# Patient Record
Sex: MALE | Race: WHITE | NOT HISPANIC OR LATINO | URBAN - METROPOLITAN AREA
[De-identification: names, ages, dates, MRNs, and addresses within clinical notes are randomized per-mention and may not be internally consistent; named-entity substitution may affect disease eponyms.]

---

## 2018-09-12 ENCOUNTER — INPATIENT (INPATIENT)
Facility: HOSPITAL | Age: 23
LOS: 8 days | Discharge: HOME | End: 2018-09-21
Attending: ORTHOPAEDIC SURGERY | Admitting: ORTHOPAEDIC SURGERY
Payer: COMMERCIAL

## 2018-09-12 VITALS
TEMPERATURE: 98 F | SYSTOLIC BLOOD PRESSURE: 120 MMHG | OXYGEN SATURATION: 99 % | HEART RATE: 79 BPM | DIASTOLIC BLOOD PRESSURE: 74 MMHG | RESPIRATION RATE: 17 BRPM

## 2018-09-12 LAB
ALBUMIN SERPL ELPH-MCNC: 4.9 G/DL — SIGNIFICANT CHANGE UP (ref 3.5–5.2)
ALP SERPL-CCNC: 98 U/L — SIGNIFICANT CHANGE UP (ref 30–115)
ALT FLD-CCNC: 18 U/L — SIGNIFICANT CHANGE UP (ref 0–41)
ANION GAP SERPL CALC-SCNC: 14 MMOL/L — SIGNIFICANT CHANGE UP (ref 7–14)
APPEARANCE UR: CLEAR — SIGNIFICANT CHANGE UP
APTT BLD: 29.1 SEC — SIGNIFICANT CHANGE UP (ref 27–39.2)
AST SERPL-CCNC: 24 U/L — SIGNIFICANT CHANGE UP (ref 0–41)
BASOPHILS # BLD AUTO: 0.05 K/UL — SIGNIFICANT CHANGE UP (ref 0–0.2)
BASOPHILS NFR BLD AUTO: 0.5 % — SIGNIFICANT CHANGE UP (ref 0–1)
BILIRUB DIRECT SERPL-MCNC: <0.2 MG/DL — SIGNIFICANT CHANGE UP (ref 0–0.2)
BILIRUB INDIRECT FLD-MCNC: >0.2 MG/DL — SIGNIFICANT CHANGE UP (ref 0.2–1.2)
BILIRUB SERPL-MCNC: 0.4 MG/DL — SIGNIFICANT CHANGE UP (ref 0.2–1.2)
BILIRUB UR-MCNC: NEGATIVE — SIGNIFICANT CHANGE UP
BLD GP AB SCN SERPL QL: SIGNIFICANT CHANGE UP
BUN SERPL-MCNC: 12 MG/DL — SIGNIFICANT CHANGE UP (ref 10–20)
CALCIUM SERPL-MCNC: 9.1 MG/DL — SIGNIFICANT CHANGE UP (ref 8.5–10.1)
CHLORIDE SERPL-SCNC: 98 MMOL/L — SIGNIFICANT CHANGE UP (ref 98–110)
CO2 SERPL-SCNC: 27 MMOL/L — SIGNIFICANT CHANGE UP (ref 17–32)
COLOR SPEC: YELLOW — SIGNIFICANT CHANGE UP
CREAT SERPL-MCNC: 1 MG/DL — SIGNIFICANT CHANGE UP (ref 0.7–1.5)
DIFF PNL FLD: NEGATIVE — SIGNIFICANT CHANGE UP
EOSINOPHIL # BLD AUTO: 0.16 K/UL — SIGNIFICANT CHANGE UP (ref 0–0.7)
EOSINOPHIL NFR BLD AUTO: 1.5 % — SIGNIFICANT CHANGE UP (ref 0–8)
GLUCOSE SERPL-MCNC: 110 MG/DL — HIGH (ref 70–99)
GLUCOSE UR QL: NEGATIVE MG/DL — SIGNIFICANT CHANGE UP
HCT VFR BLD CALC: 41.3 % — LOW (ref 42–52)
HGB BLD-MCNC: 14.1 G/DL — SIGNIFICANT CHANGE UP (ref 14–18)
IMM GRANULOCYTES NFR BLD AUTO: 0.6 % — HIGH (ref 0.1–0.3)
INR BLD: 1.18 RATIO — SIGNIFICANT CHANGE UP (ref 0.65–1.3)
KETONES UR-MCNC: NEGATIVE — SIGNIFICANT CHANGE UP
LACTATE SERPL-SCNC: 1.5 MMOL/L — SIGNIFICANT CHANGE UP (ref 0.5–2.2)
LEUKOCYTE ESTERASE UR-ACNC: NEGATIVE — SIGNIFICANT CHANGE UP
LIDOCAIN IGE QN: 32 U/L — SIGNIFICANT CHANGE UP (ref 7–60)
LYMPHOCYTES # BLD AUTO: 1.55 K/UL — SIGNIFICANT CHANGE UP (ref 1.2–3.4)
LYMPHOCYTES # BLD AUTO: 14.7 % — LOW (ref 20.5–51.1)
MCHC RBC-ENTMCNC: 29 PG — SIGNIFICANT CHANGE UP (ref 27–31)
MCHC RBC-ENTMCNC: 34.1 G/DL — SIGNIFICANT CHANGE UP (ref 32–37)
MCV RBC AUTO: 85 FL — SIGNIFICANT CHANGE UP (ref 80–94)
MONOCYTES # BLD AUTO: 0.8 K/UL — HIGH (ref 0.1–0.6)
MONOCYTES NFR BLD AUTO: 7.6 % — SIGNIFICANT CHANGE UP (ref 1.7–9.3)
NEUTROPHILS # BLD AUTO: 7.96 K/UL — HIGH (ref 1.4–6.5)
NEUTROPHILS NFR BLD AUTO: 75.1 % — SIGNIFICANT CHANGE UP (ref 42.2–75.2)
NITRITE UR-MCNC: NEGATIVE — SIGNIFICANT CHANGE UP
NRBC # BLD: 0 /100 WBCS — SIGNIFICANT CHANGE UP (ref 0–0)
PH UR: 7 — SIGNIFICANT CHANGE UP (ref 5–8)
PLATELET # BLD AUTO: 220 K/UL — SIGNIFICANT CHANGE UP (ref 130–400)
POTASSIUM SERPL-MCNC: 4.2 MMOL/L — SIGNIFICANT CHANGE UP (ref 3.5–5)
POTASSIUM SERPL-SCNC: 4.2 MMOL/L — SIGNIFICANT CHANGE UP (ref 3.5–5)
PROT SERPL-MCNC: 7.6 G/DL — SIGNIFICANT CHANGE UP (ref 6–8)
PROT UR-MCNC: NEGATIVE MG/DL — SIGNIFICANT CHANGE UP
PROTHROM AB SERPL-ACNC: 12.7 SEC — SIGNIFICANT CHANGE UP (ref 9.95–12.87)
RBC # BLD: 4.86 M/UL — SIGNIFICANT CHANGE UP (ref 4.7–6.1)
RBC # FLD: 11.9 % — SIGNIFICANT CHANGE UP (ref 11.5–14.5)
SODIUM SERPL-SCNC: 139 MMOL/L — SIGNIFICANT CHANGE UP (ref 135–146)
SP GR SPEC: 1.01 — SIGNIFICANT CHANGE UP (ref 1.01–1.03)
TYPE + AB SCN PNL BLD: SIGNIFICANT CHANGE UP
UROBILINOGEN FLD QL: 0.2 MG/DL — SIGNIFICANT CHANGE UP (ref 0.2–0.2)
WBC # BLD: 10.58 K/UL — SIGNIFICANT CHANGE UP (ref 4.8–10.8)
WBC # FLD AUTO: 10.58 K/UL — SIGNIFICANT CHANGE UP (ref 4.8–10.8)

## 2018-09-12 RX ORDER — MORPHINE SULFATE 50 MG/1
4 CAPSULE, EXTENDED RELEASE ORAL ONCE
Qty: 0 | Refills: 0 | Status: DISCONTINUED | OUTPATIENT
Start: 2018-09-12 | End: 2018-09-12

## 2018-09-12 RX ORDER — PANTOPRAZOLE SODIUM 20 MG/1
40 TABLET, DELAYED RELEASE ORAL
Qty: 0 | Refills: 0 | Status: DISCONTINUED | OUTPATIENT
Start: 2018-09-12 | End: 2018-09-15

## 2018-09-12 RX ORDER — SODIUM CHLORIDE 9 MG/ML
500 INJECTION INTRAMUSCULAR; INTRAVENOUS; SUBCUTANEOUS ONCE
Qty: 0 | Refills: 0 | Status: COMPLETED | OUTPATIENT
Start: 2018-09-12 | End: 2018-09-12

## 2018-09-12 RX ORDER — ONDANSETRON 8 MG/1
4 TABLET, FILM COATED ORAL ONCE
Qty: 0 | Refills: 0 | Status: COMPLETED | OUTPATIENT
Start: 2018-09-12 | End: 2018-09-12

## 2018-09-12 RX ORDER — TETANUS TOXOID, REDUCED DIPHTHERIA TOXOID AND ACELLULAR PERTUSSIS VACCINE, ADSORBED 5; 2.5; 8; 8; 2.5 [IU]/.5ML; [IU]/.5ML; UG/.5ML; UG/.5ML; UG/.5ML
0.5 SUSPENSION INTRAMUSCULAR ONCE
Qty: 0 | Refills: 0 | Status: COMPLETED | OUTPATIENT
Start: 2018-09-12 | End: 2018-09-12

## 2018-09-12 RX ORDER — ACETAMINOPHEN 500 MG
650 TABLET ORAL EVERY 6 HOURS
Qty: 0 | Refills: 0 | Status: DISCONTINUED | OUTPATIENT
Start: 2018-09-12 | End: 2018-09-13

## 2018-09-12 RX ORDER — IBUPROFEN 200 MG
400 TABLET ORAL EVERY 6 HOURS
Qty: 0 | Refills: 0 | Status: DISCONTINUED | OUTPATIENT
Start: 2018-09-12 | End: 2018-09-13

## 2018-09-12 RX ORDER — DEXTROSE MONOHYDRATE, SODIUM CHLORIDE, AND POTASSIUM CHLORIDE 50; .745; 4.5 G/1000ML; G/1000ML; G/1000ML
1000 INJECTION, SOLUTION INTRAVENOUS
Qty: 0 | Refills: 0 | Status: DISCONTINUED | OUTPATIENT
Start: 2018-09-12 | End: 2018-09-12

## 2018-09-12 RX ORDER — OXYCODONE AND ACETAMINOPHEN 5; 325 MG/1; MG/1
1 TABLET ORAL EVERY 6 HOURS
Qty: 0 | Refills: 0 | Status: DISCONTINUED | OUTPATIENT
Start: 2018-09-12 | End: 2018-09-13

## 2018-09-12 RX ADMIN — SODIUM CHLORIDE 1000 MILLILITER(S): 9 INJECTION INTRAMUSCULAR; INTRAVENOUS; SUBCUTANEOUS at 14:30

## 2018-09-12 RX ADMIN — TETANUS TOXOID, REDUCED DIPHTHERIA TOXOID AND ACELLULAR PERTUSSIS VACCINE, ADSORBED 0.5 MILLILITER(S): 5; 2.5; 8; 8; 2.5 SUSPENSION INTRAMUSCULAR at 16:41

## 2018-09-12 RX ADMIN — ONDANSETRON 4 MILLIGRAM(S): 8 TABLET, FILM COATED ORAL at 14:30

## 2018-09-12 RX ADMIN — MORPHINE SULFATE 4 MILLIGRAM(S): 50 CAPSULE, EXTENDED RELEASE ORAL at 14:30

## 2018-09-12 RX ADMIN — MORPHINE SULFATE 4 MILLIGRAM(S): 50 CAPSULE, EXTENDED RELEASE ORAL at 18:03

## 2018-09-12 NOTE — ED PROVIDER NOTE - NS ED ROS FT
Review of Systems  Constitutional: (-) fever  Eyes/ENT: (-) blurry vision, (-) epistaxis  Cardiovascular: (-) chest pain, (-) syncope  Respiratory: (-) cough, (-) shortness of breath  Gastrointestinal: (-) vomiting, (-) diarrhea  Musculoskeletal: (-) neck pain, (-) back pain, (-) joint pain  Integumentary: (-) rash  Neurological: (-) headache, (-) altered mental status

## 2018-09-12 NOTE — H&P ADULT - RS GEN PE MLT RESP DETAILS PC
clear to auscultation bilaterally/airway patent/no rhonchi/no rales/no chest wall tenderness/respirations non-labored
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
## 2018-09-12 NOTE — ED PROVIDER NOTE - PHYSICAL EXAMINATION
Gen: Alert, in visible distress secondary to injuries/pain   Head: Posterior scalp hematoma, PERRL, EOMI, normal lids/conjunctiva, no cuevas sign  ENT: normal hearing, patent oropharynx without erythema/exudate, uvula midline  Neck: +supple, no midline tenderness  Pulm: Bilateral BS, normal resp effort, no wheeze/stridor/retractions  CV: RRR, no M/R/G  Abd: soft, NT/ND  Mskel: Left Wrist- posterior deformity with tenderness to palpation laterally. Decreased ROM in flexion, extension, pronation, supination.  strength 3/5.            Right ankle- medial deformity. Unable to ambulate or bear weight            No shoulder, elbow, vertebral, paraspinal, pelvic, or hip tenderness present on palpation.   Skin: Abrasions present on the Left ribs, anterior chest, right anterior forearm, right palmar surface of hand, left anterior forearm, right proximal medial thigh, and proximal right knee. Well healed scar present on the left anterior chest from prior trauma.   Neuro: AAOx3, no sensory deficits  Vascular: Radial and pedal pulses present bilaterally 2+

## 2018-09-12 NOTE — H&P ADULT - NSHPLABSRESULTS_GEN_ALL_CORE
< from: CT Angio Pelvis w/ IV Cont (09.12.18 @ 16:56) >    EXAM:  CT ANGIO CHEST (W)AW IC        EXAM:  CT ANGIO ABD PELVIS (W)AW ICIMPRESSION:    No CT evidence of acute traumatic intrathoracic or intra-abdominal   pathology.    < end of copied text >        < from: Xray Foot AP + Lateral, Right (09.12.18 @ 16:06) >      EXAM:  XR FOOT 2 VIEWS RT      Partially visualized distal tibial fracture. The rest of the osseous   structures are intact.    < end of copied text >        < from: Xray Tibia + Fibula 2 Views, Right (09.12.18 @ 16:05) >    EXAM:  XR TIB FIB AP LAT 2 VIEWS RT    There is slightly displaced spiral fracture involvingthe distal tibia.   Fibula appears to be intact. There is soft tissue swelling of the distal   lower extremity.    < end of copied text >          < from: Xray Ankle Complete 3 Views, Right (09.12.18 @ 16:05) >      EXAM:  XR ANKLE COMP MIN 3 VIEWS RT      There is a slightly displaced spiral fracture involving the distal tibia.   Associated soft tissue swelling in the left lower extremity. Ankle joint   is intact without any dislocation.    < end of copied text >        < from: Xray Wrist 3 Views, Left (09.12.18 @ 16:04) >    EXAM:  XR WRIST COMP MIN 3 VIEWS LT   There is displaced and comminuted intra-articular fracture involving the   distal radius. The distal fracture fragments are displaced ventrally. The   carpal bones and abdominal are intact. There is soft tissue swelling   around the wrist.    < end of copied text >        < from: Xray Forearm, Left (09.12.18 @ 16:03) >    EXAM:  XR FOREARM 2 VIEWS LT    There is a comminuted and displaced cortical fracture of the distal   radius. The distal fracture fragments are ventrally displaced. The   proximal and mid radius and ulna are within normal limits.    < end of copied text >          < from: Xray Hand 3 Views, Left (09.12.18 @ 16:03) >    EXAM:  XR HAND MIN 3 VIEWS LT    Displaced and comminuted intra-articular fracture of distal radius.   Reminder of the osseous structures are intact. There is soft tissue   swelling around the wrist.    < end of copied text >
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
## 2018-09-12 NOTE — ED ADULT NURSE NOTE - NSIMPLEMENTINTERV_GEN_ALL_ED
Implemented All Fall Risk Interventions:  [place] to call system. Call bell, personal items and telephone within reach. Instruct patient to call for assistance. Room bathroom lighting operational. Non-slip footwear when patient is off stretcher. Physically safe environment: no spills, clutter or unnecessary equipment. Stretcher in lowest position, wheels locked, appropriate side rails in place. Provide visual cue, wrist band, yellow gown, etc. Monitor gait and stability. Monitor for mental status changes and reorient to person, place, and time. Review medications for side effects contributing to fall risk. Reinforce activity limits and safety measures with patient and family.
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
## 2018-09-13 LAB
ANION GAP SERPL CALC-SCNC: 16 MMOL/L — HIGH (ref 7–14)
BASOPHILS # BLD AUTO: 0.05 K/UL — SIGNIFICANT CHANGE UP (ref 0–0.2)
BASOPHILS NFR BLD AUTO: 0.4 % — SIGNIFICANT CHANGE UP (ref 0–1)
BUN SERPL-MCNC: 9 MG/DL — LOW (ref 10–20)
CALCIUM SERPL-MCNC: 9 MG/DL — SIGNIFICANT CHANGE UP (ref 8.5–10.1)
CHLORIDE SERPL-SCNC: 101 MMOL/L — SIGNIFICANT CHANGE UP (ref 98–110)
CO2 SERPL-SCNC: 23 MMOL/L — SIGNIFICANT CHANGE UP (ref 17–32)
CREAT SERPL-MCNC: 0.8 MG/DL — SIGNIFICANT CHANGE UP (ref 0.7–1.5)
EOSINOPHIL # BLD AUTO: 0.04 K/UL — SIGNIFICANT CHANGE UP (ref 0–0.7)
EOSINOPHIL NFR BLD AUTO: 0.3 % — SIGNIFICANT CHANGE UP (ref 0–8)
GLUCOSE SERPL-MCNC: 104 MG/DL — HIGH (ref 70–99)
HCT VFR BLD CALC: 40.2 % — LOW (ref 42–52)
HGB BLD-MCNC: 13.8 G/DL — LOW (ref 14–18)
IMM GRANULOCYTES NFR BLD AUTO: 0.6 % — HIGH (ref 0.1–0.3)
LYMPHOCYTES # BLD AUTO: 1.67 K/UL — SIGNIFICANT CHANGE UP (ref 1.2–3.4)
LYMPHOCYTES # BLD AUTO: 13.8 % — LOW (ref 20.5–51.1)
MAGNESIUM SERPL-MCNC: 1.8 MG/DL — SIGNIFICANT CHANGE UP (ref 1.8–2.4)
MCHC RBC-ENTMCNC: 28.7 PG — SIGNIFICANT CHANGE UP (ref 27–31)
MCHC RBC-ENTMCNC: 34.3 G/DL — SIGNIFICANT CHANGE UP (ref 32–37)
MCV RBC AUTO: 83.6 FL — SIGNIFICANT CHANGE UP (ref 80–94)
MONOCYTES # BLD AUTO: 1.43 K/UL — HIGH (ref 0.1–0.6)
MONOCYTES NFR BLD AUTO: 11.8 % — HIGH (ref 1.7–9.3)
NEUTROPHILS # BLD AUTO: 8.83 K/UL — HIGH (ref 1.4–6.5)
NEUTROPHILS NFR BLD AUTO: 73.1 % — SIGNIFICANT CHANGE UP (ref 42.2–75.2)
NRBC # BLD: 0 /100 WBCS — SIGNIFICANT CHANGE UP (ref 0–0)
PHOSPHATE SERPL-MCNC: 3.5 MG/DL — SIGNIFICANT CHANGE UP (ref 2.1–4.9)
PLATELET # BLD AUTO: 202 K/UL — SIGNIFICANT CHANGE UP (ref 130–400)
POTASSIUM SERPL-MCNC: 3.9 MMOL/L — SIGNIFICANT CHANGE UP (ref 3.5–5)
POTASSIUM SERPL-SCNC: 3.9 MMOL/L — SIGNIFICANT CHANGE UP (ref 3.5–5)
RBC # BLD: 4.81 M/UL — SIGNIFICANT CHANGE UP (ref 4.7–6.1)
RBC # FLD: 11.8 % — SIGNIFICANT CHANGE UP (ref 11.5–14.5)
SODIUM SERPL-SCNC: 140 MMOL/L — SIGNIFICANT CHANGE UP (ref 135–146)
WBC # BLD: 12.09 K/UL — HIGH (ref 4.8–10.8)
WBC # FLD AUTO: 12.09 K/UL — HIGH (ref 4.8–10.8)

## 2018-09-13 PROCEDURE — 99223 1ST HOSP IP/OBS HIGH 75: CPT

## 2018-09-13 RX ORDER — SENNA PLUS 8.6 MG/1
2 TABLET ORAL AT BEDTIME
Qty: 0 | Refills: 0 | Status: DISCONTINUED | OUTPATIENT
Start: 2018-09-13 | End: 2018-09-15

## 2018-09-13 RX ORDER — DOCUSATE SODIUM 100 MG
100 CAPSULE ORAL
Qty: 0 | Refills: 0 | Status: DISCONTINUED | OUTPATIENT
Start: 2018-09-13 | End: 2018-09-15

## 2018-09-13 RX ORDER — OXYCODONE HYDROCHLORIDE 5 MG/1
5 TABLET ORAL EVERY 4 HOURS
Qty: 0 | Refills: 0 | Status: DISCONTINUED | OUTPATIENT
Start: 2018-09-13 | End: 2018-09-15

## 2018-09-13 RX ORDER — IBUPROFEN 200 MG
600 TABLET ORAL EVERY 6 HOURS
Qty: 0 | Refills: 0 | Status: DISCONTINUED | OUTPATIENT
Start: 2018-09-13 | End: 2018-09-15

## 2018-09-13 RX ORDER — MAGNESIUM SULFATE 500 MG/ML
2 VIAL (ML) INJECTION ONCE
Qty: 0 | Refills: 0 | Status: COMPLETED | OUTPATIENT
Start: 2018-09-13 | End: 2018-09-13

## 2018-09-13 RX ORDER — ENOXAPARIN SODIUM 100 MG/ML
40 INJECTION SUBCUTANEOUS DAILY
Qty: 0 | Refills: 0 | Status: DISCONTINUED | OUTPATIENT
Start: 2018-09-13 | End: 2018-09-15

## 2018-09-13 RX ORDER — ACETAMINOPHEN 500 MG
650 TABLET ORAL EVERY 6 HOURS
Qty: 0 | Refills: 0 | Status: DISCONTINUED | OUTPATIENT
Start: 2018-09-13 | End: 2018-09-15

## 2018-09-13 RX ORDER — HEPARIN SODIUM 5000 [USP'U]/ML
5000 INJECTION INTRAVENOUS; SUBCUTANEOUS EVERY 8 HOURS
Qty: 0 | Refills: 0 | Status: DISCONTINUED | OUTPATIENT
Start: 2018-09-13 | End: 2018-09-13

## 2018-09-13 RX ADMIN — Medication 600 MILLIGRAM(S): at 23:08

## 2018-09-13 RX ADMIN — HEPARIN SODIUM 5000 UNIT(S): 5000 INJECTION INTRAVENOUS; SUBCUTANEOUS at 05:08

## 2018-09-13 RX ADMIN — SENNA PLUS 2 TABLET(S): 8.6 TABLET ORAL at 21:05

## 2018-09-13 RX ADMIN — Medication 600 MILLIGRAM(S): at 17:18

## 2018-09-13 RX ADMIN — OXYCODONE HYDROCHLORIDE 5 MILLIGRAM(S): 5 TABLET ORAL at 11:58

## 2018-09-13 RX ADMIN — OXYCODONE HYDROCHLORIDE 5 MILLIGRAM(S): 5 TABLET ORAL at 22:18

## 2018-09-13 RX ADMIN — Medication 650 MILLIGRAM(S): at 17:18

## 2018-09-13 RX ADMIN — Medication 600 MILLIGRAM(S): at 11:02

## 2018-09-13 RX ADMIN — Medication 650 MILLIGRAM(S): at 05:09

## 2018-09-13 RX ADMIN — Medication 600 MILLIGRAM(S): at 11:58

## 2018-09-13 RX ADMIN — Medication 650 MILLIGRAM(S): at 17:50

## 2018-09-13 RX ADMIN — Medication 650 MILLIGRAM(S): at 11:58

## 2018-09-13 RX ADMIN — OXYCODONE HYDROCHLORIDE 5 MILLIGRAM(S): 5 TABLET ORAL at 10:56

## 2018-09-13 RX ADMIN — Medication 650 MILLIGRAM(S): at 11:02

## 2018-09-13 RX ADMIN — Medication 50 GRAM(S): at 06:28

## 2018-09-13 RX ADMIN — Medication 600 MILLIGRAM(S): at 17:50

## 2018-09-13 RX ADMIN — ENOXAPARIN SODIUM 40 MILLIGRAM(S): 100 INJECTION SUBCUTANEOUS at 12:33

## 2018-09-13 RX ADMIN — Medication 100 MILLIGRAM(S): at 17:18

## 2018-09-13 RX ADMIN — PANTOPRAZOLE SODIUM 40 MILLIGRAM(S): 20 TABLET, DELAYED RELEASE ORAL at 08:14

## 2018-09-13 RX ADMIN — OXYCODONE AND ACETAMINOPHEN 1 TABLET(S): 5; 325 TABLET ORAL at 00:52

## 2018-09-13 RX ADMIN — Medication 650 MILLIGRAM(S): at 23:07
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
## 2018-09-14 LAB
ANION GAP SERPL CALC-SCNC: 15 MMOL/L — HIGH (ref 7–14)
ANION GAP SERPL CALC-SCNC: 18 MMOL/L — HIGH (ref 7–14)
APTT BLD: 32.7 SEC — SIGNIFICANT CHANGE UP (ref 27–39.2)
BASOPHILS # BLD AUTO: 0.05 K/UL — SIGNIFICANT CHANGE UP (ref 0–0.2)
BASOPHILS # BLD AUTO: 0.05 K/UL — SIGNIFICANT CHANGE UP (ref 0–0.2)
BASOPHILS NFR BLD AUTO: 0.5 % — SIGNIFICANT CHANGE UP (ref 0–1)
BASOPHILS NFR BLD AUTO: 0.6 % — SIGNIFICANT CHANGE UP (ref 0–1)
BUN SERPL-MCNC: 11 MG/DL — SIGNIFICANT CHANGE UP (ref 10–20)
BUN SERPL-MCNC: 9 MG/DL — LOW (ref 10–20)
CALCIUM SERPL-MCNC: 9 MG/DL — SIGNIFICANT CHANGE UP (ref 8.5–10.1)
CALCIUM SERPL-MCNC: 9.4 MG/DL — SIGNIFICANT CHANGE UP (ref 8.5–10.1)
CHLORIDE SERPL-SCNC: 99 MMOL/L — SIGNIFICANT CHANGE UP (ref 98–110)
CHLORIDE SERPL-SCNC: 99 MMOL/L — SIGNIFICANT CHANGE UP (ref 98–110)
CO2 SERPL-SCNC: 24 MMOL/L — SIGNIFICANT CHANGE UP (ref 17–32)
CO2 SERPL-SCNC: 26 MMOL/L — SIGNIFICANT CHANGE UP (ref 17–32)
CREAT SERPL-MCNC: 0.9 MG/DL — SIGNIFICANT CHANGE UP (ref 0.7–1.5)
CREAT SERPL-MCNC: 0.9 MG/DL — SIGNIFICANT CHANGE UP (ref 0.7–1.5)
EOSINOPHIL # BLD AUTO: 0.18 K/UL — SIGNIFICANT CHANGE UP (ref 0–0.7)
EOSINOPHIL # BLD AUTO: 0.25 K/UL — SIGNIFICANT CHANGE UP (ref 0–0.7)
EOSINOPHIL NFR BLD AUTO: 1.8 % — SIGNIFICANT CHANGE UP (ref 0–8)
EOSINOPHIL NFR BLD AUTO: 2.8 % — SIGNIFICANT CHANGE UP (ref 0–8)
GLUCOSE SERPL-MCNC: 102 MG/DL — HIGH (ref 70–99)
GLUCOSE SERPL-MCNC: 123 MG/DL — HIGH (ref 70–99)
HCT VFR BLD CALC: 38.5 % — LOW (ref 42–52)
HCT VFR BLD CALC: 41 % — LOW (ref 42–52)
HGB BLD-MCNC: 13.3 G/DL — LOW (ref 14–18)
HGB BLD-MCNC: 13.9 G/DL — LOW (ref 14–18)
IMM GRANULOCYTES NFR BLD AUTO: 0.3 % — SIGNIFICANT CHANGE UP (ref 0.1–0.3)
IMM GRANULOCYTES NFR BLD AUTO: 0.4 % — HIGH (ref 0.1–0.3)
INR BLD: 1.12 RATIO — SIGNIFICANT CHANGE UP (ref 0.65–1.3)
LYMPHOCYTES # BLD AUTO: 1.32 K/UL — SIGNIFICANT CHANGE UP (ref 1.2–3.4)
LYMPHOCYTES # BLD AUTO: 12.9 % — LOW (ref 20.5–51.1)
LYMPHOCYTES # BLD AUTO: 2.31 K/UL — SIGNIFICANT CHANGE UP (ref 1.2–3.4)
LYMPHOCYTES # BLD AUTO: 25.6 % — SIGNIFICANT CHANGE UP (ref 20.5–51.1)
MAGNESIUM SERPL-MCNC: 1.9 MG/DL — SIGNIFICANT CHANGE UP (ref 1.8–2.4)
MAGNESIUM SERPL-MCNC: 2.3 MG/DL — SIGNIFICANT CHANGE UP (ref 1.8–2.4)
MCHC RBC-ENTMCNC: 28.6 PG — SIGNIFICANT CHANGE UP (ref 27–31)
MCHC RBC-ENTMCNC: 29 PG — SIGNIFICANT CHANGE UP (ref 27–31)
MCHC RBC-ENTMCNC: 33.9 G/DL — SIGNIFICANT CHANGE UP (ref 32–37)
MCHC RBC-ENTMCNC: 34.5 G/DL — SIGNIFICANT CHANGE UP (ref 32–37)
MCV RBC AUTO: 84.1 FL — SIGNIFICANT CHANGE UP (ref 80–94)
MCV RBC AUTO: 84.4 FL — SIGNIFICANT CHANGE UP (ref 80–94)
MONOCYTES # BLD AUTO: 0.88 K/UL — HIGH (ref 0.1–0.6)
MONOCYTES # BLD AUTO: 1.21 K/UL — HIGH (ref 0.1–0.6)
MONOCYTES NFR BLD AUTO: 13.4 % — HIGH (ref 1.7–9.3)
MONOCYTES NFR BLD AUTO: 8.6 % — SIGNIFICANT CHANGE UP (ref 1.7–9.3)
NEUTROPHILS # BLD AUTO: 5.18 K/UL — SIGNIFICANT CHANGE UP (ref 1.4–6.5)
NEUTROPHILS # BLD AUTO: 7.8 K/UL — HIGH (ref 1.4–6.5)
NEUTROPHILS NFR BLD AUTO: 57.3 % — SIGNIFICANT CHANGE UP (ref 42.2–75.2)
NEUTROPHILS NFR BLD AUTO: 75.8 % — HIGH (ref 42.2–75.2)
NRBC # BLD: 0 /100 WBCS — SIGNIFICANT CHANGE UP (ref 0–0)
PHOSPHATE SERPL-MCNC: 3.8 MG/DL — SIGNIFICANT CHANGE UP (ref 2.1–4.9)
PHOSPHATE SERPL-MCNC: 4.4 MG/DL — SIGNIFICANT CHANGE UP (ref 2.1–4.9)
PLATELET # BLD AUTO: 187 K/UL — SIGNIFICANT CHANGE UP (ref 130–400)
PLATELET # BLD AUTO: 200 K/UL — SIGNIFICANT CHANGE UP (ref 130–400)
POTASSIUM SERPL-MCNC: 4.2 MMOL/L — SIGNIFICANT CHANGE UP (ref 3.5–5)
POTASSIUM SERPL-MCNC: 4.6 MMOL/L — SIGNIFICANT CHANGE UP (ref 3.5–5)
POTASSIUM SERPL-SCNC: 4.2 MMOL/L — SIGNIFICANT CHANGE UP (ref 3.5–5)
POTASSIUM SERPL-SCNC: 4.6 MMOL/L — SIGNIFICANT CHANGE UP (ref 3.5–5)
PROTHROM AB SERPL-ACNC: 12.1 SEC — SIGNIFICANT CHANGE UP (ref 9.95–12.87)
RBC # BLD: 4.58 M/UL — LOW (ref 4.7–6.1)
RBC # BLD: 4.86 M/UL — SIGNIFICANT CHANGE UP (ref 4.7–6.1)
RBC # FLD: 11.8 % — SIGNIFICANT CHANGE UP (ref 11.5–14.5)
RBC # FLD: 12 % — SIGNIFICANT CHANGE UP (ref 11.5–14.5)
SODIUM SERPL-SCNC: 140 MMOL/L — SIGNIFICANT CHANGE UP (ref 135–146)
SODIUM SERPL-SCNC: 141 MMOL/L — SIGNIFICANT CHANGE UP (ref 135–146)
TYPE + AB SCN PNL BLD: SIGNIFICANT CHANGE UP
WBC # BLD: 10.27 K/UL — SIGNIFICANT CHANGE UP (ref 4.8–10.8)
WBC # BLD: 9.03 K/UL — SIGNIFICANT CHANGE UP (ref 4.8–10.8)
WBC # FLD AUTO: 10.27 K/UL — SIGNIFICANT CHANGE UP (ref 4.8–10.8)
WBC # FLD AUTO: 9.03 K/UL — SIGNIFICANT CHANGE UP (ref 4.8–10.8)

## 2018-09-14 PROCEDURE — 99223 1ST HOSP IP/OBS HIGH 75: CPT

## 2018-09-14 RX ORDER — SODIUM CHLORIDE 9 MG/ML
1000 INJECTION INTRAMUSCULAR; INTRAVENOUS; SUBCUTANEOUS
Qty: 0 | Refills: 0 | Status: DISCONTINUED | OUTPATIENT
Start: 2018-09-14 | End: 2018-09-15

## 2018-09-14 RX ADMIN — Medication 100 MILLIGRAM(S): at 05:03

## 2018-09-14 RX ADMIN — Medication 650 MILLIGRAM(S): at 17:10

## 2018-09-14 RX ADMIN — Medication 600 MILLIGRAM(S): at 23:29

## 2018-09-14 RX ADMIN — Medication 600 MILLIGRAM(S): at 17:12

## 2018-09-14 RX ADMIN — PANTOPRAZOLE SODIUM 40 MILLIGRAM(S): 20 TABLET, DELAYED RELEASE ORAL at 08:07

## 2018-09-14 RX ADMIN — OXYCODONE HYDROCHLORIDE 5 MILLIGRAM(S): 5 TABLET ORAL at 23:58

## 2018-09-14 RX ADMIN — ENOXAPARIN SODIUM 40 MILLIGRAM(S): 100 INJECTION SUBCUTANEOUS at 12:30

## 2018-09-14 RX ADMIN — Medication 650 MILLIGRAM(S): at 23:28

## 2018-09-14 RX ADMIN — Medication 600 MILLIGRAM(S): at 05:03

## 2018-09-14 RX ADMIN — Medication 650 MILLIGRAM(S): at 17:11

## 2018-09-14 RX ADMIN — Medication 600 MILLIGRAM(S): at 17:10

## 2018-09-14 RX ADMIN — Medication 100 MILLIGRAM(S): at 17:09

## 2018-09-14 RX ADMIN — Medication 600 MILLIGRAM(S): at 23:58

## 2018-09-14 RX ADMIN — Medication 650 MILLIGRAM(S): at 05:03

## 2018-09-14 RX ADMIN — SODIUM CHLORIDE 100 MILLILITER(S): 9 INJECTION INTRAMUSCULAR; INTRAVENOUS; SUBCUTANEOUS at 23:51

## 2018-09-14 RX ADMIN — Medication 650 MILLIGRAM(S): at 23:58

## 2018-09-14 RX ADMIN — SENNA PLUS 2 TABLET(S): 8.6 TABLET ORAL at 21:02

## 2018-09-14 RX ADMIN — OXYCODONE HYDROCHLORIDE 5 MILLIGRAM(S): 5 TABLET ORAL at 23:28

## 2018-09-14 NOTE — OCCUPATIONAL THERAPY INITIAL EVALUATION ADULT - PLANNED THERAPY INTERVENTIONS, OT EVAL
stretching/balance training/transfer training/ADL retraining/bed mobility training/strengthening/ROM

## 2018-09-14 NOTE — OCCUPATIONAL THERAPY INITIAL EVALUATION ADULT - RANGE OF MOTION EXAMINATION, UPPER EXTREMITY
Right UE Active ROM was WFL (within functional limits)/LUE shoulder WFL, elbow WFL, wrist/digits unable to assess secondary to splint

## 2018-09-14 NOTE — PHYSICAL THERAPY INITIAL EVALUATION ADULT - GENERAL OBSERVATIONS, REHAB EVAL
[phone] Patient encountered seated in  bed side chair + R LE splint  and L UE splint ,+ IV Lock, NAD receptive to PT

## 2018-09-14 NOTE — PHYSICAL THERAPY INITIAL EVALUATION ADULT - PLANNED THERAPY INTERVENTIONS, PT EVAL
balance training/transfer training/bed mobility training/wheelchair management/propulsion training/gait training

## 2018-09-14 NOTE — PHYSICAL THERAPY INITIAL EVALUATION ADULT - RANGE OF MOTION EXAMINATION, REHAB EVAL
Right UE ROM was WNL (within normal limits)/Right LE ROM was WNL (within normal limits)/L shoulder , R hip , knee and ,
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
## 2018-09-15 RX ORDER — HYDROMORPHONE HYDROCHLORIDE 2 MG/ML
0.5 INJECTION INTRAMUSCULAR; INTRAVENOUS; SUBCUTANEOUS
Qty: 0 | Refills: 0 | Status: DISCONTINUED | OUTPATIENT
Start: 2018-09-15 | End: 2018-09-16

## 2018-09-15 RX ORDER — IBUPROFEN 200 MG
600 TABLET ORAL EVERY 6 HOURS
Qty: 0 | Refills: 0 | Status: DISCONTINUED | OUTPATIENT
Start: 2018-09-15 | End: 2018-09-20

## 2018-09-15 RX ORDER — SODIUM CHLORIDE 9 MG/ML
1000 INJECTION, SOLUTION INTRAVENOUS
Qty: 0 | Refills: 0 | Status: DISCONTINUED | OUTPATIENT
Start: 2018-09-15 | End: 2018-09-16

## 2018-09-15 RX ORDER — SENNA PLUS 8.6 MG/1
2 TABLET ORAL AT BEDTIME
Qty: 0 | Refills: 0 | Status: DISCONTINUED | OUTPATIENT
Start: 2018-09-15 | End: 2018-09-20

## 2018-09-15 RX ORDER — ACETAMINOPHEN 500 MG
650 TABLET ORAL EVERY 6 HOURS
Qty: 0 | Refills: 0 | Status: DISCONTINUED | OUTPATIENT
Start: 2018-09-15 | End: 2018-09-20

## 2018-09-15 RX ORDER — ONDANSETRON 8 MG/1
4 TABLET, FILM COATED ORAL EVERY 6 HOURS
Qty: 0 | Refills: 0 | Status: DISCONTINUED | OUTPATIENT
Start: 2018-09-15 | End: 2018-09-20

## 2018-09-15 RX ORDER — DOCUSATE SODIUM 100 MG
100 CAPSULE ORAL
Qty: 0 | Refills: 0 | Status: DISCONTINUED | OUTPATIENT
Start: 2018-09-15 | End: 2018-09-20

## 2018-09-15 RX ORDER — HYDROMORPHONE HYDROCHLORIDE 2 MG/ML
1 INJECTION INTRAMUSCULAR; INTRAVENOUS; SUBCUTANEOUS
Qty: 0 | Refills: 0 | Status: DISCONTINUED | OUTPATIENT
Start: 2018-09-15 | End: 2018-09-16

## 2018-09-15 RX ORDER — PANTOPRAZOLE SODIUM 20 MG/1
40 TABLET, DELAYED RELEASE ORAL
Qty: 0 | Refills: 0 | Status: DISCONTINUED | OUTPATIENT
Start: 2018-09-15 | End: 2018-09-20

## 2018-09-15 RX ORDER — METOCLOPRAMIDE HCL 10 MG
10 TABLET ORAL ONCE
Qty: 0 | Refills: 0 | Status: DISCONTINUED | OUTPATIENT
Start: 2018-09-15 | End: 2018-09-16

## 2018-09-15 RX ORDER — OXYCODONE HYDROCHLORIDE 5 MG/1
10 TABLET ORAL EVERY 4 HOURS
Qty: 0 | Refills: 0 | Status: DISCONTINUED | OUTPATIENT
Start: 2018-09-15 | End: 2018-09-20

## 2018-09-15 RX ORDER — OXYCODONE HYDROCHLORIDE 5 MG/1
5 TABLET ORAL EVERY 4 HOURS
Qty: 0 | Refills: 0 | Status: DISCONTINUED | OUTPATIENT
Start: 2018-09-15 | End: 2018-09-20

## 2018-09-15 RX ORDER — ENOXAPARIN SODIUM 100 MG/ML
40 INJECTION SUBCUTANEOUS DAILY
Qty: 0 | Refills: 0 | Status: DISCONTINUED | OUTPATIENT
Start: 2018-09-15 | End: 2018-09-20

## 2018-09-15 RX ORDER — MEPERIDINE HYDROCHLORIDE 50 MG/ML
12.5 INJECTION INTRAMUSCULAR; INTRAVENOUS; SUBCUTANEOUS
Qty: 0 | Refills: 0 | Status: DISCONTINUED | OUTPATIENT
Start: 2018-09-15 | End: 2018-09-16

## 2018-09-15 RX ORDER — ONDANSETRON 8 MG/1
4 TABLET, FILM COATED ORAL ONCE
Qty: 0 | Refills: 0 | Status: DISCONTINUED | OUTPATIENT
Start: 2018-09-15 | End: 2018-09-16

## 2018-09-15 RX ADMIN — Medication 600 MILLIGRAM(S): at 05:15

## 2018-09-15 RX ADMIN — PANTOPRAZOLE SODIUM 40 MILLIGRAM(S): 20 TABLET, DELAYED RELEASE ORAL at 05:13

## 2018-09-15 RX ADMIN — HYDROMORPHONE HYDROCHLORIDE 1 MILLIGRAM(S): 2 INJECTION INTRAMUSCULAR; INTRAVENOUS; SUBCUTANEOUS at 12:45

## 2018-09-15 RX ADMIN — SODIUM CHLORIDE 125 MILLILITER(S): 9 INJECTION, SOLUTION INTRAVENOUS at 22:14

## 2018-09-15 RX ADMIN — Medication 650 MILLIGRAM(S): at 05:13

## 2018-09-15 RX ADMIN — HYDROMORPHONE HYDROCHLORIDE 1 MILLIGRAM(S): 2 INJECTION INTRAMUSCULAR; INTRAVENOUS; SUBCUTANEOUS at 12:15

## 2018-09-15 RX ADMIN — Medication 600 MILLIGRAM(S): at 17:40

## 2018-09-15 RX ADMIN — Medication 650 MILLIGRAM(S): at 18:40

## 2018-09-15 RX ADMIN — HYDROMORPHONE HYDROCHLORIDE 1 MILLIGRAM(S): 2 INJECTION INTRAMUSCULAR; INTRAVENOUS; SUBCUTANEOUS at 12:30

## 2018-09-15 RX ADMIN — Medication 600 MILLIGRAM(S): at 05:14

## 2018-09-15 RX ADMIN — Medication 100 MILLIGRAM(S): at 17:39

## 2018-09-15 RX ADMIN — PANTOPRAZOLE SODIUM 40 MILLIGRAM(S): 20 TABLET, DELAYED RELEASE ORAL at 22:15

## 2018-09-15 RX ADMIN — Medication 650 MILLIGRAM(S): at 17:39

## 2018-09-15 RX ADMIN — Medication 100 MILLIGRAM(S): at 18:21

## 2018-09-15 RX ADMIN — Medication 650 MILLIGRAM(S): at 05:15

## 2018-09-15 RX ADMIN — Medication 600 MILLIGRAM(S): at 18:22

## 2018-09-15 RX ADMIN — Medication 650 MILLIGRAM(S): at 23:39

## 2018-09-15 RX ADMIN — Medication 600 MILLIGRAM(S): at 23:39

## 2018-09-15 RX ADMIN — Medication 100 MILLIGRAM(S): at 05:13

## 2018-09-15 RX ADMIN — SENNA PLUS 2 TABLET(S): 8.6 TABLET ORAL at 22:15

## 2018-09-15 NOTE — PROGRESS NOTE ADULT - SUBJECTIVE AND OBJECTIVE BOX
pt seen and examined  agree with prior notes and exam  r/b/a explained to patient (bleeding, infection, NV damage, nonunion, malunion, knee pain, dvt, pe, death, possible revision surgery in the future)  all questions answered  will plan for right tibia im nail today  orif left wrist will be staged for later date
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
## 2018-09-15 NOTE — BRIEF OPERATIVE NOTE - PRE-OP DX
Other closed fracture of distal end of right tibia, initial encounter  09/15/2018    Active  José Miguel Story

## 2018-09-15 NOTE — PROGRESS NOTE ADULT - SUBJECTIVE AND OBJECTIVE BOX
GENERAL SURGERY PROGRESS NOTE     ATILIO BROWN  [address] [place] [address] day :4d  Surgical Attending: Neymar Gave  Overnight events: No acute events overnight. Patient is preopped today for OR w/ ortho for repair of tib/fib fx.    T(F): 97 (09-15-18 @ 00:03), Max: 99.1 (09-14-18 @ 16:08)  HR: 79 (09-15-18 @ 00:03) (78 - 94)  BP: 129/66 (09-15-18 @ 00:03) (118/64 - 129/66)  ABP: --  ABP(mean): --  RR: 18 (09-15-18 @ 00:03) (17 - 18)  SpO2: --      09-13-18 @ 07:01  -  09-14-18 @ 07:00  --------------------------------------------------------  IN:    Oral Fluid: 240 mL  Total IN: 240 mL    OUT:    Voided: 1600 mL  Total OUT: 1600 mL    Total NET: -1360 mL      09-14-18 @ 07:01  -  09-15-18 @ 01:46  --------------------------------------------------------  IN:    Oral Fluid: 1170 mL  Total IN: 1170 mL    OUT:    Voided: 1650 mL  Total OUT: 1650 mL    Total NET: -480 mL        DIET/FLUIDS: sodium chloride 0.9%. 1000 milliLiter(s) IV Continuous <Continuous>     GI proph:  pantoprazole    Tablet 40 milliGRAM(s) Oral before breakfast    AC/ proph:   ABx:     PHYSICAL EXAM:  GENERAL: NAD, well-appearing  CHEST/LUNG: Clear to auscultation bilaterally  HEART: Regular rate and rhythm  ABDOMEN: Soft, Nontender, Nondistended;   EXTREMITIES:  R leg wrapped in ace bandage, L hand, arm in ace bandage      LABS  Labs:  CAPILLARY BLOOD GLUCOSE                              13.3   10.27 )-----------( 200      ( 14 Sep 2018 18:32 )             38.5       Auto Immature Granulocyte %: 0.4 % (09-14-18 @ 18:32)  Auto Neutrophil %: 75.8 % (09-14-18 @ 18:32)    09-14    141  |  99  |  11  ----------------------------<  123<H>  4.2   |  24  |  0.9      Calcium, Total Serum: 9.0 mg/dL (09-14-18 @ 18:32)     Lactate, Blood: 1.5 mmol/L (09-12-18 @ 14:26)      Coags:     12.10  ----< 1.12    ( 14 Sep 2018 18:32 )     32.7      RADIOLOGY & ADDITIONAL TESTS:  < from: CT Tibia/Fibia No Cont, Right (09.12.18 @ 22:50) >  IMPRESSION:  1.  Acute spiral fracture of distal tibial shaft with minimal   displacement as above.  2.  Acute nondisplaced fracture of the posterior malleolus contacting the   tibiotalar joint.  3.  Acute nondisplaced longitudinal fracture of the proximal fibula   extending into the articular surface.  4.  Tiny avulsion fragments off the lateral tibia at the anterior   tibiofibular ligament insertion site.    < end of copied text >

## 2018-09-15 NOTE — BRIEF OPERATIVE NOTE - PROCEDURE
<<-----Click on this checkbox to enter Procedure Closed insertion, intramedullary aniyah, fracture, tibia  09/15/2018    Active  [id number]

## 2018-09-15 NOTE — CHART NOTE - NSCHARTNOTEFT_GEN_A_CORE
PACU ANESTHESIA ADMISSION NOTE      Procedure: Closed insertion, intramedullary aniyah, fracture, tibia    Post op diagnosis:  Other closed fracture of distal end of right tibia      ____  Intubated  TV:______       Rate: ______      FiO2: ______    _x___  Patent Airway    _x___  Full return of protective reflexes    _x___  Full recovery from anesthesia / back to baseline status    Vitals:  see anesth record    Mental Status:  _x___ Awake   _____ Alert   _____ Drowsy   _____ Sedated    Nausea/Vomiting:  _x___  NO       ______Yes,   See Post - Op Orders         Pain Scale (0-10):  __0___    Treatment: _x___ None    ____ See Post - Op/PCA Orders    Post - Operative Fluids:   __x__ Oral   ____ See Post - Op Orders    Plan: Discharge:   ___Home       ___x__Floor     _____Critical Care    _____  Other:_________________    Comments:  No anesthesia issues or complications noted.  Discharge when criteria met.

## 2018-09-16 RX ORDER — LACTULOSE 10 G/15ML
20 SOLUTION ORAL ONCE
Qty: 0 | Refills: 0 | Status: COMPLETED | OUTPATIENT
Start: 2018-09-16 | End: 2018-09-16

## 2018-09-16 RX ADMIN — Medication 600 MILLIGRAM(S): at 05:21

## 2018-09-16 RX ADMIN — LACTULOSE 20 GRAM(S): 10 SOLUTION ORAL at 21:53

## 2018-09-16 RX ADMIN — Medication 650 MILLIGRAM(S): at 17:42

## 2018-09-16 RX ADMIN — Medication 650 MILLIGRAM(S): at 01:24

## 2018-09-16 RX ADMIN — Medication 600 MILLIGRAM(S): at 06:56

## 2018-09-16 RX ADMIN — Medication 600 MILLIGRAM(S): at 01:24

## 2018-09-16 RX ADMIN — SENNA PLUS 2 TABLET(S): 8.6 TABLET ORAL at 21:53

## 2018-09-16 RX ADMIN — Medication 600 MILLIGRAM(S): at 17:41

## 2018-09-16 RX ADMIN — Medication 650 MILLIGRAM(S): at 12:00

## 2018-09-16 RX ADMIN — Medication 600 MILLIGRAM(S): at 23:48

## 2018-09-16 RX ADMIN — OXYCODONE HYDROCHLORIDE 10 MILLIGRAM(S): 5 TABLET ORAL at 05:30

## 2018-09-16 RX ADMIN — Medication 10 MILLIGRAM(S): at 20:45

## 2018-09-16 RX ADMIN — Medication 100 MILLIGRAM(S): at 05:21

## 2018-09-16 RX ADMIN — PANTOPRAZOLE SODIUM 40 MILLIGRAM(S): 20 TABLET, DELAYED RELEASE ORAL at 05:20

## 2018-09-16 RX ADMIN — SODIUM CHLORIDE 125 MILLILITER(S): 9 INJECTION, SOLUTION INTRAVENOUS at 06:59

## 2018-09-16 RX ADMIN — ENOXAPARIN SODIUM 40 MILLIGRAM(S): 100 INJECTION SUBCUTANEOUS at 12:00

## 2018-09-16 RX ADMIN — Medication 650 MILLIGRAM(S): at 05:21

## 2018-09-16 RX ADMIN — Medication 100 MILLIGRAM(S): at 17:42

## 2018-09-16 RX ADMIN — Medication 600 MILLIGRAM(S): at 18:30

## 2018-09-16 RX ADMIN — Medication 600 MILLIGRAM(S): at 12:00

## 2018-09-16 RX ADMIN — Medication 100 MILLIGRAM(S): at 01:24

## 2018-09-16 RX ADMIN — Medication 650 MILLIGRAM(S): at 18:30

## 2018-09-16 RX ADMIN — Medication 650 MILLIGRAM(S): at 23:48

## 2018-09-16 RX ADMIN — Medication 650 MILLIGRAM(S): at 06:55

## 2018-09-16 RX ADMIN — OXYCODONE HYDROCHLORIDE 10 MILLIGRAM(S): 5 TABLET ORAL at 04:52

## 2018-09-16 RX ADMIN — Medication 600 MILLIGRAM(S): at 11:59
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
## 2018-09-16 NOTE — PROGRESS NOTE ADULT - SUBJECTIVE AND OBJECTIVE BOX
progress note F/U: alert in bed post RT LE SX . tolerated the procedure well for left wrist SX possible tomorrow.  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 pt is RT handed.    IMPRESSION: Rehab of LUE  fx, RUE tib FX S /P sx --NWB LUE and RLE       PRECAUTIONS: [    ] Cardiac  [    ] Respiratory  [    ] Seizures [    ] Contact Isolation  [    ] Droplet Isolation  [    ] Other    Weight Bearing Status: as above    RECOMMENDATION:    Out of Bed to Chair     DVT/Decubiti Prophylaxis    REHAB PLAN:     [   x  ] Bedside P/T 3-5 times a week   [   x  ] Bedside O/T  2-3 times a week   [     ] No Rehab Therapy Indicated   [     ]  Speech Therapy   Conditioning/ROM                                 ADL  Bed Mobility                                            Conditioning/ROM  Transfers                                                  Bed Mobility  Sitting /Standing Balance                      Transfers                                        Gait Training                                            Sitting/Standing Balance  Stair Training [   ]Applicable                 Home equipment Eval                                                                     Splinting  [   ] Only      GOALS:   ADL   [    ]   Independent         Transfers  [    ] Independent            Ambulation  [     ] Independent     [     ] With device                            [  x  ]  CG                                               [ x   ]  CG                                                    [   x  ] CG                            [ x   ] Min A                                          [ x   ] Min A                                                [  x   ] Min  A          DISCHARGE PLAN:   [   x  ]  Good candidate for Intensive Rehabilitation/Hospital based-[facility]A [facility]                                             Will tolerate 3hrs Intensive Rehab Daily                                       [      ]  Short Term Rehab in Skilled Nursing Facility                                       [      ]  Home with Outpatient or VN services                                         [      ]  Possible Candidate for Intensive Hospital based Rehab

## 2018-09-16 NOTE — PROGRESS NOTE ADULT - ATTENDING COMMENTS
22yMale  No Acute Events    T(C): 37 (09-16-18 @ 19:01), Max: 37.1 (09-16-18 @ 00:05)  HR: 74 (09-16-18 @ 19:01) (71 - 82)  BP: 116/65 (09-16-18 @ 19:01) (107/56 - 118/55)  RR: 18 (09-16-18 @ 19:01) (18 - 18)  SpO2: --    PE  NAD  Compartments soft, NT  NVI  clean/dry/intact    Plan  - DVT PPx  - IS  - SCD  - PT  - Pain Control

## 2018-09-17 RX ADMIN — Medication 650 MILLIGRAM(S): at 07:00

## 2018-09-17 RX ADMIN — Medication 650 MILLIGRAM(S): at 23:27

## 2018-09-17 RX ADMIN — Medication 600 MILLIGRAM(S): at 17:23

## 2018-09-17 RX ADMIN — Medication 650 MILLIGRAM(S): at 06:30

## 2018-09-17 RX ADMIN — Medication 600 MILLIGRAM(S): at 00:20

## 2018-09-17 RX ADMIN — SENNA PLUS 2 TABLET(S): 8.6 TABLET ORAL at 21:00

## 2018-09-17 RX ADMIN — Medication 600 MILLIGRAM(S): at 07:00

## 2018-09-17 RX ADMIN — Medication 650 MILLIGRAM(S): at 13:13

## 2018-09-17 RX ADMIN — Medication 600 MILLIGRAM(S): at 23:27

## 2018-09-17 RX ADMIN — PANTOPRAZOLE SODIUM 40 MILLIGRAM(S): 20 TABLET, DELAYED RELEASE ORAL at 06:30

## 2018-09-17 RX ADMIN — Medication 600 MILLIGRAM(S): at 13:13

## 2018-09-17 RX ADMIN — Medication 650 MILLIGRAM(S): at 00:20

## 2018-09-17 RX ADMIN — Medication 650 MILLIGRAM(S): at 18:00

## 2018-09-17 RX ADMIN — Medication 100 MILLIGRAM(S): at 17:24

## 2018-09-17 RX ADMIN — Medication 650 MILLIGRAM(S): at 17:21

## 2018-09-17 RX ADMIN — ENOXAPARIN SODIUM 40 MILLIGRAM(S): 100 INJECTION SUBCUTANEOUS at 13:13

## 2018-09-17 RX ADMIN — Medication 600 MILLIGRAM(S): at 18:00

## 2018-09-17 RX ADMIN — Medication 600 MILLIGRAM(S): at 06:30
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
## 2018-09-18 RX ADMIN — Medication 650 MILLIGRAM(S): at 05:41

## 2018-09-18 RX ADMIN — Medication 650 MILLIGRAM(S): at 05:42

## 2018-09-18 RX ADMIN — Medication 600 MILLIGRAM(S): at 05:42

## 2018-09-18 RX ADMIN — PANTOPRAZOLE SODIUM 40 MILLIGRAM(S): 20 TABLET, DELAYED RELEASE ORAL at 07:51

## 2018-09-18 RX ADMIN — Medication 650 MILLIGRAM(S): at 17:22

## 2018-09-18 RX ADMIN — Medication 600 MILLIGRAM(S): at 23:40

## 2018-09-18 RX ADMIN — Medication 100 MILLIGRAM(S): at 05:41

## 2018-09-18 RX ADMIN — Medication 600 MILLIGRAM(S): at 17:19

## 2018-09-18 RX ADMIN — Medication 100 MILLIGRAM(S): at 17:21

## 2018-09-18 RX ADMIN — Medication 600 MILLIGRAM(S): at 17:22

## 2018-09-18 RX ADMIN — ENOXAPARIN SODIUM 40 MILLIGRAM(S): 100 INJECTION SUBCUTANEOUS at 11:40

## 2018-09-18 RX ADMIN — Medication 600 MILLIGRAM(S): at 23:39

## 2018-09-18 RX ADMIN — Medication 650 MILLIGRAM(S): at 17:19

## 2018-09-18 RX ADMIN — Medication 650 MILLIGRAM(S): at 23:40

## 2018-09-18 RX ADMIN — SENNA PLUS 2 TABLET(S): 8.6 TABLET ORAL at 21:06

## 2018-09-18 RX ADMIN — Medication 650 MILLIGRAM(S): at 23:39
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
## 2018-09-19 RX ADMIN — Medication 600 MILLIGRAM(S): at 05:19

## 2018-09-19 RX ADMIN — PANTOPRAZOLE SODIUM 40 MILLIGRAM(S): 20 TABLET, DELAYED RELEASE ORAL at 05:19

## 2018-09-19 RX ADMIN — ENOXAPARIN SODIUM 40 MILLIGRAM(S): 100 INJECTION SUBCUTANEOUS at 11:07

## 2018-09-19 RX ADMIN — Medication 600 MILLIGRAM(S): at 23:15

## 2018-09-19 RX ADMIN — Medication 650 MILLIGRAM(S): at 23:15

## 2018-09-19 RX ADMIN — Medication 100 MILLIGRAM(S): at 05:19

## 2018-09-19 RX ADMIN — Medication 600 MILLIGRAM(S): at 11:46

## 2018-09-19 RX ADMIN — Medication 650 MILLIGRAM(S): at 05:19

## 2018-09-19 RX ADMIN — SENNA PLUS 2 TABLET(S): 8.6 TABLET ORAL at 23:15

## 2018-09-19 RX ADMIN — Medication 650 MILLIGRAM(S): at 11:06

## 2018-09-19 RX ADMIN — Medication 600 MILLIGRAM(S): at 11:06

## 2018-09-19 RX ADMIN — Medication 650 MILLIGRAM(S): at 11:46

## 2018-09-19 RX ADMIN — Medication 100 MILLIGRAM(S): at 17:02
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
## 2018-09-19 NOTE — DIETITIAN INITIAL EVALUATION ADULT. - PHYSICAL APPEARANCE
BMI 28.5 (68in, 188#). Pt unsure of UBW but denies recent changes. 1+ pitting edema R ankle and R foot. L arm splint/ BS 20./well nourished

## 2018-09-19 NOTE — DIETITIAN INITIAL EVALUATION ADULT. - ENERGY NEEDS
total kcal = ~6438-1887 kcal/day (MSJ x 1.0-1.1 d/t overweight BMI).  total protein = 68-85 g/day (0.8-1.0 g/kg CBW).   total fluid = 1mL : 1 kcal   tot

## 2018-09-19 NOTE — DIETITIAN INITIAL EVALUATION ADULT. - ORAL INTAKE PTA
Pt reports adequate appetite and PO intake 3 meals/day + occasional snacks. NKFA. Denies use of nutrition supplements./good

## 2018-09-19 NOTE — PROGRESS NOTE ADULT - SUBJECTIVE AND OBJECTIVE BOX
ORTHO PROGRESS NOTE       22y Male POD # 4      S/P orif right tibia                                   left wrist fracture     Patient seen and examined at bedside . The patient is awake and alert in NAD. No complaints of chest pain, SOB, N/V.    Vital Signs Last 24 Hrs  T(C): 36.3 (19 Sep 2018 07:33), Max: 37.3 (19 Sep 2018 00:00)  T(F): 97.4 (19 Sep 2018 07:33), Max: 99.2 (19 Sep 2018 00:00)  HR: 86 (19 Sep 2018 07:33) (73 - 86)  BP: 103/56 (19 Sep 2018 07:33) (103/56 - 118/85)  BP(mean): --  RR: 18 (19 Sep 2018 07:33) (18 - 18)  SpO2: --        DVT ppx : lovenox     MEDICATIONS  (STANDING):  acetaminophen   Tablet .. 650 milliGRAM(s) Oral every 6 hours  docusate sodium 100 milliGRAM(s) Oral two times a day  enoxaparin Injectable 40 milliGRAM(s) SubCutaneous daily  ibuprofen  Tablet. 600 milliGRAM(s) Oral every 6 hours  pantoprazole    Tablet 40 milliGRAM(s) Oral before breakfast  senna 2 Tablet(s) Oral at bedtime    MEDICATIONS  (PRN):  ondansetron Injectable 4 milliGRAM(s) IV Push every 6 hours PRN Nausea and/or Vomiting  oxyCODONE    IR 5 milliGRAM(s) Oral every 4 hours PRN Moderate Pain (4 - 6)  oxyCODONE    IR 10 milliGRAM(s) Oral every 4 hours PRN Severe Pain (7 - 10)      PE:   right lower ext incisions C/D/I          Compartments soft         NVI, SILT           A/P: 22yMale POD # 4  S/P orif right distal tibia , doing well.                                     left wrist fx for orif tomorrow            OOB to Chair            Physical Therapy           Pain control            Incentive Spirometry            DVT Prophylaxis            Discharge planning

## 2018-09-19 NOTE — DIETITIAN INITIAL EVALUATION ADULT. - FACTORS AFF FOOD INTAKE
c/o constipation 2 days ago but improved with bowel medication. LBM 9/18. Denies difficulty chewing/swallowing.

## 2018-09-20 RX ORDER — OXYCODONE HYDROCHLORIDE 5 MG/1
5 TABLET ORAL EVERY 4 HOURS
Qty: 0 | Refills: 0 | Status: DISCONTINUED | OUTPATIENT
Start: 2018-09-20 | End: 2018-09-21

## 2018-09-20 RX ORDER — SENNA PLUS 8.6 MG/1
2 TABLET ORAL AT BEDTIME
Qty: 0 | Refills: 0 | Status: DISCONTINUED | OUTPATIENT
Start: 2018-09-20 | End: 2018-09-21

## 2018-09-20 RX ORDER — PANTOPRAZOLE SODIUM 20 MG/1
40 TABLET, DELAYED RELEASE ORAL
Qty: 0 | Refills: 0 | Status: DISCONTINUED | OUTPATIENT
Start: 2018-09-20 | End: 2018-09-21

## 2018-09-20 RX ORDER — ACETAMINOPHEN 500 MG
650 TABLET ORAL EVERY 6 HOURS
Qty: 0 | Refills: 0 | Status: DISCONTINUED | OUTPATIENT
Start: 2018-09-20 | End: 2018-09-21

## 2018-09-20 RX ORDER — MEPERIDINE HYDROCHLORIDE 50 MG/ML
12.5 INJECTION INTRAMUSCULAR; INTRAVENOUS; SUBCUTANEOUS
Qty: 0 | Refills: 0 | Status: DISCONTINUED | OUTPATIENT
Start: 2018-09-20 | End: 2018-09-20

## 2018-09-20 RX ORDER — ONDANSETRON 8 MG/1
4 TABLET, FILM COATED ORAL EVERY 6 HOURS
Qty: 0 | Refills: 0 | Status: DISCONTINUED | OUTPATIENT
Start: 2018-09-20 | End: 2018-09-21

## 2018-09-20 RX ORDER — DOCUSATE SODIUM 100 MG
100 CAPSULE ORAL
Qty: 0 | Refills: 0 | Status: DISCONTINUED | OUTPATIENT
Start: 2018-09-20 | End: 2018-09-21

## 2018-09-20 RX ORDER — HYDROMORPHONE HYDROCHLORIDE 2 MG/ML
1 INJECTION INTRAMUSCULAR; INTRAVENOUS; SUBCUTANEOUS
Qty: 0 | Refills: 0 | Status: DISCONTINUED | OUTPATIENT
Start: 2018-09-20 | End: 2018-09-20

## 2018-09-20 RX ORDER — SODIUM CHLORIDE 9 MG/ML
1000 INJECTION, SOLUTION INTRAVENOUS
Qty: 0 | Refills: 0 | Status: DISCONTINUED | OUTPATIENT
Start: 2018-09-20 | End: 2018-09-20

## 2018-09-20 RX ORDER — ENOXAPARIN SODIUM 100 MG/ML
40 INJECTION SUBCUTANEOUS DAILY
Qty: 0 | Refills: 0 | Status: DISCONTINUED | OUTPATIENT
Start: 2018-09-20 | End: 2018-09-21

## 2018-09-20 RX ORDER — OXYCODONE HYDROCHLORIDE 5 MG/1
10 TABLET ORAL EVERY 4 HOURS
Qty: 0 | Refills: 0 | Status: DISCONTINUED | OUTPATIENT
Start: 2018-09-20 | End: 2018-09-21

## 2018-09-20 RX ORDER — HYDROMORPHONE HYDROCHLORIDE 2 MG/ML
0.5 INJECTION INTRAMUSCULAR; INTRAVENOUS; SUBCUTANEOUS
Qty: 0 | Refills: 0 | Status: DISCONTINUED | OUTPATIENT
Start: 2018-09-20 | End: 2018-09-20

## 2018-09-20 RX ORDER — ONDANSETRON 8 MG/1
4 TABLET, FILM COATED ORAL ONCE
Qty: 0 | Refills: 0 | Status: DISCONTINUED | OUTPATIENT
Start: 2018-09-20 | End: 2018-09-20

## 2018-09-20 RX ADMIN — Medication 650 MILLIGRAM(S): at 19:08

## 2018-09-20 RX ADMIN — Medication 600 MILLIGRAM(S): at 11:18

## 2018-09-20 RX ADMIN — SODIUM CHLORIDE 100 MILLILITER(S): 9 INJECTION, SOLUTION INTRAVENOUS at 17:37

## 2018-09-20 RX ADMIN — HYDROMORPHONE HYDROCHLORIDE 1 MILLIGRAM(S): 2 INJECTION INTRAMUSCULAR; INTRAVENOUS; SUBCUTANEOUS at 18:07

## 2018-09-20 RX ADMIN — Medication 100 MILLIGRAM(S): at 05:33

## 2018-09-20 RX ADMIN — ENOXAPARIN SODIUM 40 MILLIGRAM(S): 100 INJECTION SUBCUTANEOUS at 11:18

## 2018-09-20 RX ADMIN — SENNA PLUS 2 TABLET(S): 8.6 TABLET ORAL at 21:04

## 2018-09-20 RX ADMIN — Medication 600 MILLIGRAM(S): at 11:19

## 2018-09-20 RX ADMIN — Medication 650 MILLIGRAM(S): at 11:19

## 2018-09-20 RX ADMIN — Medication 100 MILLIGRAM(S): at 19:08

## 2018-09-20 RX ADMIN — PANTOPRAZOLE SODIUM 40 MILLIGRAM(S): 20 TABLET, DELAYED RELEASE ORAL at 05:33

## 2018-09-20 RX ADMIN — Medication 100 MILLIGRAM(S): at 21:03

## 2018-09-20 RX ADMIN — Medication 650 MILLIGRAM(S): at 05:33

## 2018-09-20 RX ADMIN — HYDROMORPHONE HYDROCHLORIDE 1 MILLIGRAM(S): 2 INJECTION INTRAMUSCULAR; INTRAVENOUS; SUBCUTANEOUS at 17:52

## 2018-09-20 RX ADMIN — Medication 650 MILLIGRAM(S): at 23:40

## 2018-09-20 RX ADMIN — ONDANSETRON 4 MILLIGRAM(S): 8 TABLET, FILM COATED ORAL at 19:14

## 2018-09-20 RX ADMIN — Medication 600 MILLIGRAM(S): at 05:33

## 2018-09-20 RX ADMIN — Medication 650 MILLIGRAM(S): at 11:18

## 2018-09-20 NOTE — PRE-ANESTHESIA EVALUATION ADULT - NSANTHDIETYNSD_GEN_ALL_CORE
Yes
No/ate & drank @ 0700 ( water & strawberries ) ;  Discussed to continue for a minimum of 8h pre op . Expressed understanding .
Yes

## 2018-09-20 NOTE — PROGRESS NOTE ADULT - SUBJECTIVE AND OBJECTIVE BOX
ORTHO PROGRESS NOTE       22y Male POD # 5      S/P orif right tibia                                   left wrist fracture     Patient seen and examined at bedside . The patient is awake and alert in NAD. No complaints of chest pain, SOB, N/V.    Vital Signs Last 24 Hrs  T(C): 36.3 (19 Sep 2018 07:33), Max: 37.3 (19 Sep 2018 00:00)  T(F): 97.4 (19 Sep 2018 07:33), Max: 99.2 (19 Sep 2018 00:00)  HR: 86 (19 Sep 2018 07:33) (73 - 86)  BP: 103/56 (19 Sep 2018 07:33) (103/56 - 118/85)  BP(mean): --  RR: 18 (19 Sep 2018 07:33) (18 - 18)  SpO2: --        DVT ppx : lovenox         PE:   right lower ext incisions C/D/I          Compartments soft         NVI, SILT           A/P: 22yMale POD # 5  S/P orif right distal tibia , doing well.                                     left wrist fx for orif today            OOB to Chair            Physical Therapy           Pain control            Incentive Spirometry            DVT Prophylaxis   	npo for wrist orif today            Discharge planning

## 2018-09-20 NOTE — PRE-OP CHECKLIST - AS BP NONINV SITE
Shift change, with bedside reporting completed. Reinforced Safety precautions: Call for assistance prior to activity, and use of nonskid socks before getting out of bed (OOB). Verbalized understanding of safety instructions. Hand held call-light within reach.
right upper arm
right upper arm

## 2018-09-20 NOTE — PRE-ANESTHESIA EVALUATION ADULT - NSDENTALSD_ENT_ALL_CORE
appears normal and intact
denies loose teeth/appears normal and intact
appears normal and intact
appears normal and intact

## 2018-09-20 NOTE — PRE-ANESTHESIA EVALUATION ADULT - MALLAMPATI CLASS
Class II - visualization of the soft palate, fauces, and uvula

## 2018-09-20 NOTE — PROGRESS NOTE ADULT - SUBJECTIVE AND OBJECTIVE BOX
preop note    L DR gallegos    ICU Vital Signs Last 24 Hrs  T(C): 36.1 (19 Sep 2018 15:30), Max: 36.3 (19 Sep 2018 07:33)  T(F): 97 (19 Sep 2018 15:30), Max: 97.4 (19 Sep 2018 07:33)  HR: 73 (19 Sep 2018 15:30) (73 - 86)  BP: 105/55 (19 Sep 2018 15:30) (103/56 - 105/55)  BP(mean): --  ABP: --  ABP(mean): --  RR: 18 (19 Sep 2018 15:30) (18 - 18)  SpO2: --    CBC wnl    L DR gallegos  ORIF today  NPO  pain control  NWB

## 2018-09-20 NOTE — BRIEF OPERATIVE NOTE - PROCEDURE
<<-----Click on this checkbox to enter Procedure Open reduction and internal fixation (ORIF) of 3 or more fragments of distal radius  09/20/2018    Active  [id number]

## 2018-09-20 NOTE — PRE-OP CHECKLIST - SELECT TESTS ORDERED
BMP/CBC/INR/EKG/Urinalysis/Type and Screen/CXR/PT/PTT
Results in MD note
CMP/PT/PTT/Type and Screen/Urinalysis/CBC/INR/EKG

## 2018-09-21 VITALS
DIASTOLIC BLOOD PRESSURE: 68 MMHG | TEMPERATURE: 101 F | RESPIRATION RATE: 20 BRPM | HEART RATE: 82 BPM | SYSTOLIC BLOOD PRESSURE: 118 MMHG

## 2018-09-21 RX ORDER — INFLUENZA VIRUS VACCINE 15; 15; 15; 15 UG/.5ML; UG/.5ML; UG/.5ML; UG/.5ML
0.5 SUSPENSION INTRAMUSCULAR ONCE
Qty: 0 | Refills: 0 | Status: DISCONTINUED | OUTPATIENT
Start: 2018-09-21 | End: 2018-09-21

## 2018-09-21 RX ORDER — ASPIRIN/CALCIUM CARB/MAGNESIUM 324 MG
1 TABLET ORAL
Qty: 60 | Refills: 0 | OUTPATIENT
Start: 2018-09-21 | End: 2018-10-20

## 2018-09-21 RX ORDER — DOCUSATE SODIUM 100 MG
1 CAPSULE ORAL
Qty: 0 | Refills: 0 | COMMUNITY
Start: 2018-09-21

## 2018-09-21 RX ORDER — ACETAMINOPHEN 500 MG
2 TABLET ORAL
Qty: 0 | Refills: 0 | COMMUNITY
Start: 2018-09-21

## 2018-09-21 RX ORDER — DOCUSATE SODIUM 100 MG
100 CAPSULE ORAL
Qty: 0 | Refills: 0 | Status: DISCONTINUED | OUTPATIENT
Start: 2018-09-21 | End: 2018-09-21

## 2018-09-21 RX ORDER — TRAMADOL HYDROCHLORIDE 50 MG/1
1 TABLET ORAL
Qty: 30 | Refills: 0 | OUTPATIENT
Start: 2018-09-21 | End: 2018-09-30

## 2018-09-21 RX ORDER — ACETAMINOPHEN 500 MG
650 TABLET ORAL EVERY 6 HOURS
Qty: 0 | Refills: 0 | Status: DISCONTINUED | OUTPATIENT
Start: 2018-09-21 | End: 2018-09-21

## 2018-09-21 RX ADMIN — Medication 650 MILLIGRAM(S): at 18:04

## 2018-09-21 RX ADMIN — Medication 650 MILLIGRAM(S): at 12:38

## 2018-09-21 RX ADMIN — Medication 100 MILLIGRAM(S): at 05:44

## 2018-09-21 RX ADMIN — Medication 100 MILLIGRAM(S): at 06:09

## 2018-09-21 RX ADMIN — PANTOPRAZOLE SODIUM 40 MILLIGRAM(S): 20 TABLET, DELAYED RELEASE ORAL at 05:44

## 2018-09-21 RX ADMIN — Medication 650 MILLIGRAM(S): at 05:45

## 2018-09-21 RX ADMIN — OXYCODONE HYDROCHLORIDE 5 MILLIGRAM(S): 5 TABLET ORAL at 05:43

## 2018-09-21 RX ADMIN — OXYCODONE HYDROCHLORIDE 5 MILLIGRAM(S): 5 TABLET ORAL at 06:15

## 2018-09-21 RX ADMIN — Medication 650 MILLIGRAM(S): at 05:43

## 2018-09-21 RX ADMIN — ENOXAPARIN SODIUM 40 MILLIGRAM(S): 100 INJECTION SUBCUTANEOUS at 12:38

## 2018-09-21 RX ADMIN — Medication 650 MILLIGRAM(S): at 18:05

## 2018-09-21 RX ADMIN — OXYCODONE HYDROCHLORIDE 5 MILLIGRAM(S): 5 TABLET ORAL at 10:10

## 2018-09-21 RX ADMIN — Medication 100 MILLIGRAM(S): at 18:04

## 2018-09-21 NOTE — PROGRESS NOTE ADULT - SUBJECTIVE AND OBJECTIVE BOX
ORTHO POST OP NOTE    Procedure: Open reduction and internal fixation (ORIF) of 3 or more fragments of distal radius        Patient seen and examined at bedside. No acute events since OR. Resting without complaints. Pain controlled with medication. Denies chest pain, SOB, N/V.    T(C): 36.3 (09-20-18 @ 20:00), Max: 36.7 (09-20-18 @ 17:37)  HR: 72 (09-20-18 @ 20:00) (64 - 80)  BP: 121/67 (09-20-18 @ 20:00) (94/62 - 122/67)  RR: 18 (09-20-18 @ 20:00) (10 - 18)  SpO2: 96% (09-20-18 @ 18:22) (96% - 100%)  Wt(kg): --    Exam:  Alert and [place], No Acute Distress    LUE  Dressing  C/D/I  Compartments soft/compressible  Calves soft  Motor intact distally  SILT distally  Ext wwp                  A/P: 22yM S/p L Distal Radius ORIF    -Periop antibiotics  -PT/OT  -NWB through wrist, may bear weight through forearm  -OOBTC  -DVT PPx  -Pain Control  -SCD LLE  -IS  -Continue Current Tx  -D/C home tomorrow

## 2018-09-21 NOTE — DISCHARGE NOTE ADULT - CARE PROVIDERS DIRECT ADDRESSES
[email or web address]

## 2018-09-21 NOTE — DISCHARGE NOTE ADULT - CARE PROVIDER_API CALL
Liam Hollins), Orthopaedic Surgery  [address] [place] [address]  Phone: [phone]  Fax: [phone]    Kandace Pradhan), Surgery of the Hand  [address] [place] [address]  Phone: [phone]  Fax: [phone]

## 2018-09-21 NOTE — DISCHARGE NOTE ADULT - MEDICATION SUMMARY - MEDICATIONS TO TAKE
I will START or STAY ON the medications listed below when I get home from the hospital:    acetaminophen 325 mg oral tablet  -- 2 tab(s) by mouth every 6 hours  -- Indication: For po    traMADol 50 mg oral tablet  -- 1 tab(s) by mouth every 8 hours MDD:3  -- Caution federal law prohibits the transfer of this drug to any person other  than the person for whom it was prescribed.  May cause drowsiness.  Alcohol may intensify this effect.  Use care when operating dangerous machinery.  Obtain medical advice before taking any non-prescription drugs as some may affect the action of this medication.    -- Indication: For po    Aspirin Enteric Coated 325 mg oral delayed release tablet  -- 1 tab(s) by mouth 2 times a day   -- Swallow whole.  Do not crush.  Take with food or milk.    -- Indication: For po    acetaminophen 325 mg oral tablet  -- 2 tab(s) by mouth every 6 hours  -- Indication: For po    acetaminophen 325 mg oral tablet  -- 2 tab(s) by mouth every 6 hours  -- Indication: For po    docusate sodium 100 mg oral capsule  -- 1 cap(s) by mouth 2 times a day  -- Indication: For po    docusate sodium 100 mg oral capsule  -- 1 cap(s) by mouth 2 times a day  -- Indication: For po    docusate sodium 100 mg oral capsule  -- 1 cap(s) by mouth 2 times a day  -- Indication: For po

## 2018-09-21 NOTE — DISCHARGE NOTE ADULT - PATIENT PORTAL LINK FT
You can access the [facility] Patient Portal, offered by [facility], by registering with the following website: [email or web address]

## 2018-09-21 NOTE — ANESTHESIA FOLLOW-UP NOTE - NSEVALATION_GEN_ALL_CORE
No apparent complications or complaints regarding anesthesia care at this time/All questions were answered
No apparent complications or complaints regarding anesthesia care at this time
No apparent complications or complaints regarding anesthesia care at this time

## 2018-09-21 NOTE — DISCHARGE NOTE ADULT - MEDICATION SUMMARY - MEDICATIONS TO CHANGE
I will SWITCH the dose or number of times a day I take the medications listed below when I get home from the hospital:    docusate sodium 100 mg oral capsule  -- 1 cap(s) by mouth 2 times a day    acetaminophen 325 mg oral tablet  -- 2 tab(s) by mouth every 6 hours    traMADol 50 mg oral tablet  -- 1 tab(s) by mouth every 8 hours MDD:3  -- Caution federal law prohibits the transfer of this drug to any person other  than the person for whom it was prescribed.  May cause drowsiness.  Alcohol may intensify this effect.  Use care when operating dangerous machinery.  Obtain medical advice before taking any non-prescription drugs as some may affect the action of this medication.
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
## 2018-09-26 DIAGNOSIS — S82.241A DISPLACED SPIRAL FRACTURE OF SHAFT OF RIGHT TIBIA, INITIAL ENCOUNTER FOR CLOSED FRACTURE: ICD-10-CM

## 2018-09-26 DIAGNOSIS — Z28.21 IMMUNIZATION NOT CARRIED OUT BECAUSE OF PATIENT REFUSAL: ICD-10-CM

## 2018-09-26 DIAGNOSIS — S82.831A OTHER FRACTURE OF UPPER AND LOWER END OF RIGHT FIBULA, INITIAL ENCOUNTER FOR CLOSED FRACTURE: ICD-10-CM
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
## 2018-09-26 DIAGNOSIS — Z23 ENCOUNTER FOR IMMUNIZATION: ICD-10-CM

## 2018-09-26 DIAGNOSIS — S00.03XA CONTUSION OF SCALP, INITIAL ENCOUNTER: ICD-10-CM

## 2018-09-26 DIAGNOSIS — Z88.0 ALLERGY STATUS TO PENICILLIN: ICD-10-CM

## 2018-09-26 DIAGNOSIS — S52.562A BARTON'S FRACTURE OF LEFT RADIUS, INITIAL ENCOUNTER FOR CLOSED FRACTURE: ICD-10-CM
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
## 2018-09-26 DIAGNOSIS — S82.201A UNSPECIFIED FRACTURE OF SHAFT OF RIGHT TIBIA, INITIAL ENCOUNTER FOR CLOSED FRACTURE: ICD-10-CM

## 2018-12-04 NOTE — H&P ADULT - NSHPSOURCEINFORD_GEN_ALL_CORE
Pt called back and informed had sleep study w Dr Brooke Knight. Report in epic under \"Procedures tab\" in 2012. Pt stated will call sleep center as was recommended might need a titration.    Pt will call our office back if there is a need for Dr Osiris Nayak to sig Patient

## 2019-07-01 NOTE — PHYSICAL THERAPY INITIAL EVALUATION ADULT - ASR EQUIP NEEDS DISCH PT EVAL
"I have epigastric pain for about 2 days".
wheelchair/3:1 commode/rolling walker (5 inch wheels)/platform walker

## 2021-02-10 NOTE — PHYSICAL THERAPY INITIAL EVALUATION ADULT - DIAGNOSIS, PT EVAL
R tibial fx, L radial fx Graft Donor Site Bandage (Optional-Leave Blank If You Don't Want In Note): Steri-strips and a pressure bandage were applied to the donor site.
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
